# Patient Record
Sex: FEMALE | Race: WHITE | NOT HISPANIC OR LATINO | Employment: OTHER | URBAN - NONMETROPOLITAN AREA
[De-identification: names, ages, dates, MRNs, and addresses within clinical notes are randomized per-mention and may not be internally consistent; named-entity substitution may affect disease eponyms.]

---

## 2022-09-28 ENCOUNTER — HOSPITAL ENCOUNTER (EMERGENCY)
Facility: HOSPITAL | Age: 67
Discharge: HOME | End: 2022-09-28
Attending: EMERGENCY MEDICINE
Payer: MEDICARE

## 2022-09-28 ENCOUNTER — APPOINTMENT (EMERGENCY)
Dept: CT IMAGING | Facility: HOSPITAL | Age: 67
End: 2022-09-28
Payer: MEDICARE

## 2022-09-28 ENCOUNTER — APPOINTMENT (OUTPATIENT)
Dept: RADIOLOGY | Facility: HOSPITAL | Age: 67
End: 2022-09-28
Payer: MEDICARE

## 2022-09-28 VITALS
DIASTOLIC BLOOD PRESSURE: 77 MMHG | OXYGEN SATURATION: 93 % | SYSTOLIC BLOOD PRESSURE: 172 MMHG | WEIGHT: 293 LBS | RESPIRATION RATE: 18 BRPM | TEMPERATURE: 97.7 F | HEART RATE: 87 BPM

## 2022-09-28 DIAGNOSIS — N39.0 UTI (URINARY TRACT INFECTION): ICD-10-CM

## 2022-09-28 DIAGNOSIS — R44.3 HALLUCINATIONS: Primary | ICD-10-CM

## 2022-09-28 LAB
ALBUMIN SERPL BCP-MCNC: 3.4 G/DL (ref 3.5–5)
ALP SERPL-CCNC: 170 U/L (ref 46–116)
ALT SERPL W P-5'-P-CCNC: 70 U/L (ref 12–78)
ANION GAP SERPL CALCULATED.3IONS-SCNC: 12 MMOL/L (ref 4–13)
AST SERPL W P-5'-P-CCNC: 44 U/L (ref 5–45)
BACTERIA UR QL AUTO: ABNORMAL /HPF
BASOPHILS # BLD AUTO: 0.04 THOUSANDS/ΜL (ref 0–0.1)
BASOPHILS NFR BLD AUTO: 0 % (ref 0–1)
BILIRUB SERPL-MCNC: 0.43 MG/DL (ref 0.2–1)
BILIRUB UR QL STRIP: NEGATIVE
BUN SERPL-MCNC: 30 MG/DL (ref 5–25)
CALCIUM ALBUM COR SERPL-MCNC: 10.9 MG/DL (ref 8.3–10.1)
CALCIUM SERPL-MCNC: 10.4 MG/DL (ref 8.3–10.1)
CARDIAC TROPONIN I PNL SERPL HS: 4 NG/L
CHLORIDE SERPL-SCNC: 98 MMOL/L (ref 96–108)
CLARITY UR: ABNORMAL
CO2 SERPL-SCNC: 25 MMOL/L (ref 21–32)
COLOR UR: YELLOW
CREAT SERPL-MCNC: 1.28 MG/DL (ref 0.6–1.3)
EOSINOPHIL # BLD AUTO: 0.62 THOUSAND/ΜL (ref 0–0.61)
EOSINOPHIL NFR BLD AUTO: 7 % (ref 0–6)
ERYTHROCYTE [DISTWIDTH] IN BLOOD BY AUTOMATED COUNT: 13.5 % (ref 11.6–15.1)
GFR SERPL CREATININE-BSD FRML MDRD: 43 ML/MIN/1.73SQ M
GLUCOSE SERPL-MCNC: 304 MG/DL (ref 65–140)
GLUCOSE UR STRIP-MCNC: ABNORMAL MG/DL
HCT VFR BLD AUTO: 39.5 % (ref 34.8–46.1)
HGB BLD-MCNC: 13.1 G/DL (ref 11.5–15.4)
HGB UR QL STRIP.AUTO: ABNORMAL
IMM GRANULOCYTES # BLD AUTO: 0.05 THOUSAND/UL (ref 0–0.2)
IMM GRANULOCYTES NFR BLD AUTO: 1 % (ref 0–2)
KETONES UR STRIP-MCNC: NEGATIVE MG/DL
LEUKOCYTE ESTERASE UR QL STRIP: ABNORMAL
LIPASE SERPL-CCNC: 100 U/L (ref 73–393)
LYMPHOCYTES # BLD AUTO: 2.48 THOUSANDS/ΜL (ref 0.6–4.47)
LYMPHOCYTES NFR BLD AUTO: 27 % (ref 14–44)
MCH RBC QN AUTO: 30.5 PG (ref 26.8–34.3)
MCHC RBC AUTO-ENTMCNC: 33.2 G/DL (ref 31.4–37.4)
MCV RBC AUTO: 92 FL (ref 82–98)
MONOCYTES # BLD AUTO: 0.62 THOUSAND/ΜL (ref 0.17–1.22)
MONOCYTES NFR BLD AUTO: 7 % (ref 4–12)
NEUTROPHILS # BLD AUTO: 5.26 THOUSANDS/ΜL (ref 1.85–7.62)
NEUTS SEG NFR BLD AUTO: 58 % (ref 43–75)
NITRITE UR QL STRIP: POSITIVE
NON-SQ EPI CELLS URNS QL MICRO: ABNORMAL /HPF
NRBC BLD AUTO-RTO: 0 /100 WBCS
PH UR STRIP.AUTO: 6 [PH]
PLATELET # BLD AUTO: 214 THOUSANDS/UL (ref 149–390)
PMV BLD AUTO: 8.8 FL (ref 8.9–12.7)
POTASSIUM SERPL-SCNC: 4.3 MMOL/L (ref 3.5–5.3)
PROT SERPL-MCNC: 8.4 G/DL (ref 6.4–8.4)
PROT UR STRIP-MCNC: NEGATIVE MG/DL
RBC # BLD AUTO: 4.29 MILLION/UL (ref 3.81–5.12)
RBC #/AREA URNS AUTO: ABNORMAL /HPF
SODIUM SERPL-SCNC: 135 MMOL/L (ref 135–147)
SP GR UR STRIP.AUTO: 1.02 (ref 1–1.03)
TSH SERPL DL<=0.05 MIU/L-ACNC: 0.94 UIU/ML (ref 0.45–4.5)
UROBILINOGEN UR QL STRIP.AUTO: 0.2 E.U./DL
WBC # BLD AUTO: 9.07 THOUSAND/UL (ref 4.31–10.16)
WBC #/AREA URNS AUTO: ABNORMAL /HPF
WBC CLUMPS # UR AUTO: PRESENT /UL

## 2022-09-28 PROCEDURE — 87077 CULTURE AEROBIC IDENTIFY: CPT | Performed by: EMERGENCY MEDICINE

## 2022-09-28 PROCEDURE — 87186 SC STD MICRODIL/AGAR DIL: CPT | Performed by: EMERGENCY MEDICINE

## 2022-09-28 PROCEDURE — 83690 ASSAY OF LIPASE: CPT | Performed by: EMERGENCY MEDICINE

## 2022-09-28 PROCEDURE — 81001 URINALYSIS AUTO W/SCOPE: CPT | Performed by: EMERGENCY MEDICINE

## 2022-09-28 PROCEDURE — 87086 URINE CULTURE/COLONY COUNT: CPT | Performed by: EMERGENCY MEDICINE

## 2022-09-28 PROCEDURE — 36415 COLL VENOUS BLD VENIPUNCTURE: CPT | Performed by: EMERGENCY MEDICINE

## 2022-09-28 PROCEDURE — G1004 CDSM NDSC: HCPCS

## 2022-09-28 PROCEDURE — 99284 EMERGENCY DEPT VISIT MOD MDM: CPT | Performed by: EMERGENCY MEDICINE

## 2022-09-28 PROCEDURE — 84443 ASSAY THYROID STIM HORMONE: CPT | Performed by: EMERGENCY MEDICINE

## 2022-09-28 PROCEDURE — 99285 EMERGENCY DEPT VISIT HI MDM: CPT

## 2022-09-28 PROCEDURE — 93005 ELECTROCARDIOGRAM TRACING: CPT

## 2022-09-28 PROCEDURE — 71045 X-RAY EXAM CHEST 1 VIEW: CPT

## 2022-09-28 PROCEDURE — 70450 CT HEAD/BRAIN W/O DYE: CPT

## 2022-09-28 PROCEDURE — 80175 DRUG SCREEN QUAN LAMOTRIGINE: CPT | Performed by: EMERGENCY MEDICINE

## 2022-09-28 PROCEDURE — 84484 ASSAY OF TROPONIN QUANT: CPT | Performed by: EMERGENCY MEDICINE

## 2022-09-28 PROCEDURE — 80053 COMPREHEN METABOLIC PANEL: CPT | Performed by: EMERGENCY MEDICINE

## 2022-09-28 PROCEDURE — 85025 COMPLETE CBC W/AUTO DIFF WBC: CPT | Performed by: EMERGENCY MEDICINE

## 2022-09-28 RX ORDER — CEPHALEXIN 250 MG/1
500 CAPSULE ORAL ONCE
Status: COMPLETED | OUTPATIENT
Start: 2022-09-28 | End: 2022-09-28

## 2022-09-28 RX ORDER — OXYCODONE HYDROCHLORIDE 5 MG/1
5 TABLET ORAL ONCE
Status: COMPLETED | OUTPATIENT
Start: 2022-09-28 | End: 2022-09-28

## 2022-09-28 RX ORDER — CEPHALEXIN 500 MG/1
500 CAPSULE ORAL EVERY 6 HOURS SCHEDULED
Qty: 28 CAPSULE | Refills: 0 | Status: SHIPPED | OUTPATIENT
Start: 2022-09-28 | End: 2022-10-05

## 2022-09-28 RX ADMIN — CEPHALEXIN 500 MG: 250 CAPSULE ORAL at 10:51

## 2022-09-28 RX ADMIN — OXYCODONE HYDROCHLORIDE 5 MG: 5 TABLET ORAL at 11:01

## 2022-09-28 NOTE — ED PROVIDER NOTES
History  Chief Complaint   Patient presents with    Altered Mental Status     Presents via EMS from [facility] due to increasing hallucinations over last few days  Patient c/o lower back pain with radiation into lower legs, states is receiving therapy for pain  HPI   67F w hx of DM, COPD, depression presenting with hallucinations  For the past 3 days, patient has been having hallucinations  She notes it happens when she is in bed, and occurs a few hours after she takes phenergan  At nighttime, she sees people standing there  Phenergan has been a new medication for her, and she started it 2 wks ago  She has also been on oxycodone for the past 6 months since she had knee surgery  Denies auditory hallucinations or suicidal ideations  Denies fevers, cough, SOB, chest pain, abd pain, vomiting, diarrhea  None       Past Medical History:   Diagnosis Date    Anxiety     Bipolar 1 disorder ([facility] [address] )     [facility]     Depression     Diabetes mellitus (HCC)     Hypertension     Knee pain     Osteoarthritis     Presence of urogenital implants        Past Surgical History:   Procedure Laterality Date    CORONARY ANGIOPLASTY         No family history on file  I have reviewed and agree with the history as documented  E-Cigarette/Vaping     E-Cigarette/Vaping Substances          Review of Systems   Constitutional: Negative for chills and fever  HENT: Negative for ear pain and sore throat  Eyes: Negative for pain and visual disturbance  Respiratory: Negative for cough and shortness of breath  Cardiovascular: Negative for chest pain and palpitations  Gastrointestinal: Negative for abdominal pain and vomiting  Genitourinary: Negative for dysuria and hematuria  Musculoskeletal: Positive for gait problem  Negative for arthralgias and back pain  Knee pain   Skin: Negative for color change and rash  Neurological: Negative for seizures and syncope  Psychiatric/Behavioral: Positive for hallucinations  All other systems reviewed and are negative  Physical Exam  Physical Exam  Vitals and nursing note reviewed  Constitutional:       General: She is not in acute distress  Appearance: She is well-developed  Comments: Chronically ill-appearing   HENT:      Head: Normocephalic and atraumatic  Eyes:      Conjunctiva/sclera: Conjunctivae normal    Cardiovascular:      Rate and Rhythm: Normal rate and regular rhythm  Heart sounds: No murmur heard  Pulmonary:      Effort: Pulmonary effort is normal  No respiratory distress  Breath sounds: Normal breath sounds  Abdominal:      Palpations: Abdomen is soft  Tenderness: There is no abdominal tenderness  Musculoskeletal:      Cervical back: Neck supple  Skin:     General: Skin is warm  Comments: Scarring over right knee is c/d/i  Stage 2 pressure ulcer over right heel is clean w/o discharge  Neurological:      Mental Status: She is alert and oriented to person, place, and time  Cranial Nerves: Cranial nerves are intact  Sensory: Sensation is intact  Comments: Mild upper extremity tremoring (baseline per patient)   Psychiatric:         Thought Content: Thought content does not include homicidal or suicidal ideation           Vital Signs  ED Triage Vitals   Temperature Pulse Respirations Blood Pressure SpO2   09/28/22 0718 09/28/22 0718 09/28/22 0718 09/28/22 0718 09/28/22 0718   97 8 °F (36 6 °C) 82 18 169/79 93 %      Temp Source Heart Rate Source Patient Position - Orthostatic VS BP Location FiO2 (%)   09/28/22 0718 09/28/22 0958 09/28/22 0718 09/28/22 0718 --   Temporal Monitor Lying Left arm       Pain Score       09/28/22 0718       3           Vitals:    09/28/22 0718 09/28/22 0958 09/28/22 1115   BP: 169/79 138/69 (!) 172/77   Pulse: 82 88 87   Patient Position - Orthostatic VS: Lying Lying          Visual Acuity  Visual Acuity    Flowsheet Row Most Recent Value   L Pupil Size (mm) 5   R Pupil Size (mm) 5          ED Medications  Medications   cephalexin (KEFLEX) capsule 500 mg (500 mg Oral Given 9/28/22 1051)   oxyCODONE (ROXICODONE) IR tablet 5 mg (5 mg Oral Given 9/28/22 1101)       Diagnostic Studies  Results Reviewed     Procedure Component Value Units Date/Time    Urine Microscopic [[id number]]  (Abnormal) Collected: 09/28/22 0850    Lab Status: Final result Specimen: Urine, Straight Cath Updated: 09/28/22 0946     RBC, UA 1-2 /hpf      WBC, UA 30-50 /hpf      Epithelial Cells Occasional /hpf      Bacteria, UA Innumerable /hpf      WBC Clumps Present    Urine culture [[id number]] Collected: 09/28/22 0850    Lab Status:  In process Specimen: Urine, Straight Cath Updated: 09/28/22 0945    UA w Reflex to Microscopic w Reflex to Culture [[id number]]  (Abnormal) Collected: 09/28/22 0850    Lab Status: Final result Specimen: Urine, Straight Cath Updated: 09/28/22 0922     Color, UA Yellow     Clarity, UA Slightly Cloudy     Specific [place], UA 1 020     pH, UA 6 0     Leukocytes, UA Small     Nitrite, UA Positive     Protein, UA Negative mg/dl      Glucose, UA >=1000 (1%) mg/dl      Ketones, UA Negative mg/dl      Urobilinogen, UA 0 2 E U /dl      Bilirubin, UA Negative     Occult Blood, UA Trace-lysed    Comprehensive metabolic panel [[id number]]  (Abnormal) Collected: 09/28/22 0740    Lab Status: Final result Specimen: Blood from Arm, Right Updated: 09/28/22 0815     Sodium 135 mmol/L      Potassium 4 3 mmol/L      Chloride 98 mmol/L      CO2 25 mmol/L      ANION GAP 12 mmol/L      BUN 30 mg/dL      Creatinine 1 28 mg/dL      Glucose 304 mg/dL      Calcium 10 4 mg/dL      Corrected Calcium 10 9 mg/dL      AST 44 U/L      ALT 70 U/L      Alkaline Phosphatase 170 U/L      Total Protein 8 4 g/dL      Albumin 3 4 g/dL      Total Bilirubin 0 43 mg/dL      eGFR 43 ml/min/1 73sq m     Narrative:      [place] guidelines for Chronic Kidney Disease (CKD):   Stage 1 with normal or high GFR (GFR > 90 mL/min/1 73 square meters)    Stage 2 Mild CKD (GFR = 60-89 mL/min/1 73 square meters)    Stage 3A Moderate CKD (GFR = 45-59 mL/min/1 73 square meters)    Stage 3B Moderate CKD (GFR = 30-44 mL/min/1 73 square meters)    Stage 4 Severe CKD (GFR = 15-29 mL/min/1 73 square meters)    Stage 5 End Stage CKD (GFR <15 mL/min/1 73 square meters)  Note: GFR calculation is accurate only with a steady state creatinine    Lipase [[id number]]  (Normal) Collected: 09/28/22 0740    Lab Status: Final result Specimen: Blood from Arm, Right Updated: 09/28/22 0815     Lipase 100 u/L     TSH [[id number]]  (Normal) Collected: 09/28/22 0740    Lab Status: Final result Specimen: Blood from Arm, Right Updated: 09/28/22 0815     TSH 3RD GENERATON 0 937 uIU/mL     Narrative:      Patients undergoing fluorescein dye angiography may retain small amounts of fluorescein in the body for 48-72 hours post procedure  Samples containing fluorescein can produce falsely depressed TSH values  If the patient had this procedure,a specimen should be resubmitted post fluorescein clearance  HS Troponin 0hr (reflex protocol) [[id number]]  (Normal) Collected: 09/28/22 0740    Lab Status: Final result Specimen: Blood from Arm, Right Updated: 09/28/22 0813     hs TnI 0hr 4 ng/L     Lamotrigine level [[id number]] Collected: 09/28/22 0801    Lab Status:  In process Specimen: Blood from Arm, Left Updated: 09/28/22 0804    CBC and differential [[id number]]  (Abnormal) Collected: 09/28/22 0740    Lab Status: Final result Specimen: Blood from Arm, Right Updated: 09/28/22 0745     WBC 9 07 Thousand/uL      RBC 4 29 Million/uL      Hemoglobin 13 1 g/dL      Hematocrit 39 5 %      MCV 92 fL      MCH 30 5 pg      MCHC 33 2 g/dL      RDW 13 5 %      MPV 8 8 fL      Platelets 401 Thousands/uL      nRBC 0 /100 WBCs      Neutrophils Relative 58 %      Immat GRANS % 1 %      Lymphocytes Relative 27 %      Monocytes Relative 7 %      Eosinophils Relative 7 %      Basophils Relative 0 %      Neutrophils Absolute 5 26 Thousands/µL      Immature Grans Absolute 0 05 Thousand/uL      Lymphocytes Absolute 2 48 Thousands/µL      Monocytes Absolute 0 62 Thousand/µL      Eosinophils Absolute 0 62 Thousand/µL      Basophils Absolute 0 04 Thousands/µL                  CT head without contrast   Final Result by Thom Caldera MD (09/28 9950)      No acute intracranial abnormality  Workstation performed: [id number]         XR chest 1 view portable   Final Result by Gerhardt Brier, MD (09/28 1414)      No acute cardiopulmonary disease  Workstation performed: [id number]                    Procedures  ECG 12 Lead Documentation Only    Date/Time: 9/28/2022 7:48 AM  Performed by: Henretta Riedel, MD  Authorized by: Henretta Riedel, MD     Patient location:  ED  Interpretation:     Interpretation: normal    Rate:     ECG rate:  81    ECG rate assessment: normal    Rhythm:     Rhythm: sinus rhythm    Ectopy:     Ectopy: none    QRS:     QRS axis:  Normal  Conduction:     Conduction: normal    ST segments:     ST segments:  Normal  T waves:     T waves: non-specific          ED Course  ED Course as of 09/28/22 2209   Wed Sep 28, 2022   0755 WBC: 9 07   0755 Hemoglobin: 13 1   0801 CT head  IMPRESSION:  No acute intracranial abnormality  0813 hs TnI 0hr: 4   0820 TSH 3RD GENERATON: 0 937   0820 Lipase: 100   0820 Creatinine: 1 28   0920 CXR  IMPRESSION:  No acute cardiopulmonary disease  5121 Patient endorses increased urinary frequency  Given +leukocytes and positive nitrites, will treat for UTI  MDM  67F presenting with visual hallucinations  No suicidal/homicidal ideations  No neurologic deficits  Patient alert and oriented  No reacting to external stimuli  Workup w/o leukocytosis or anemia  Mild hypercalcemia of 10 4 which is similar to previous labwork done at [facility] in the past few months   TSH wnl  CT head negative for acute intracranial abnormalities  Suspect visual hallucinations are side effect from phenergan  However, urinalysis also consistent w urinary tract infection, therefore will treat for UTI with antibiotics  UTI can also cause hallucinations  Advised continuing antibiotics and holding off on phenergan  Patient discharged back to her facility in stable condition  Home dose oxycodone given for patient's chronic back pain while awaiting transport back  Disposition  Final diagnoses:   Hallucinations   UTI (urinary tract infection)     Time reflects when diagnosis was documented in both MDM as applicable and the Disposition within this note     Time User Action Codes Description Comment    9/28/2022  8:08 AM Puneet Mendoza Add [R44 3] Hallucinations     9/28/2022  9:28 AM Puneet Mendoza Add [N39 0] UTI (urinary tract infection)       ED Disposition     ED Disposition   Discharge    Condition   Stable    Date/Time   Wed Sep 28, 2022  8:39 AM    Comment   Jeffry Guzmán discharge to home/self care  Follow-up Information    None         Discharge Medication List as of 9/28/2022  9:29 AM      START taking these medications    Details   cephalexin (KEFLEX) 500 mg capsule Take 1 capsule (500 mg total) by mouth every 6 (six) hours for 7 days, Starting Wed 9/28/2022, Until Wed 10/5/2022, Print             No discharge procedures on file      PDMP Review     None          ED Provider  Electronically Signed by           Murray Freed MD  09/28/22 6515

## 2022-09-28 NOTE — DISCHARGE INSTRUCTIONS
Your CT head did not show acute abnormalities  Your labwork was also not acutely abnormal  Your urine does look infected, therefore we are giving you antibiotics to treat a UTI  This can also cause hallucinations  However, I also suspect your hallucinations are a result of the phenergan  I would try stopping the phenergan for a few days and see if your hallucinations improve  You can use zofran as needed for nausea  Return to the ED for worsening symptoms

## 2022-09-30 LAB — BACTERIA UR CULT: ABNORMAL

## 2022-10-01 LAB
ATRIAL RATE: 81 BPM
P AXIS: 50 DEGREES
PR INTERVAL: 176 MS
QRS AXIS: -28 DEGREES
QRSD INTERVAL: 94 MS
QT INTERVAL: 372 MS
QTC INTERVAL: 432 MS
T WAVE AXIS: 34 DEGREES
VENTRICULAR RATE: 81 BPM

## 2022-10-01 PROCEDURE — 93010 ELECTROCARDIOGRAM REPORT: CPT | Performed by: INTERNAL MEDICINE

## 2022-10-03 LAB — LAMOTRIGINE SERPL-MCNC: 2.3 UG/ML (ref 2–20)
